# Patient Record
Sex: FEMALE | Race: OTHER | HISPANIC OR LATINO | Employment: FULL TIME | URBAN - METROPOLITAN AREA
[De-identification: names, ages, dates, MRNs, and addresses within clinical notes are randomized per-mention and may not be internally consistent; named-entity substitution may affect disease eponyms.]

---

## 2018-02-08 ENCOUNTER — APPOINTMENT (EMERGENCY)
Dept: CT IMAGING | Facility: HOSPITAL | Age: 33
End: 2018-02-08
Payer: COMMERCIAL

## 2018-02-08 ENCOUNTER — APPOINTMENT (EMERGENCY)
Dept: RADIOLOGY | Facility: HOSPITAL | Age: 33
End: 2018-02-08
Payer: COMMERCIAL

## 2018-02-08 ENCOUNTER — HOSPITAL ENCOUNTER (EMERGENCY)
Facility: HOSPITAL | Age: 33
Discharge: HOME | End: 2018-02-08
Attending: EMERGENCY MEDICINE | Admitting: EMERGENCY MEDICINE
Payer: COMMERCIAL

## 2018-02-08 VITALS
DIASTOLIC BLOOD PRESSURE: 84 MMHG | TEMPERATURE: 97.9 F | HEART RATE: 81 BPM | SYSTOLIC BLOOD PRESSURE: 136 MMHG | RESPIRATION RATE: 20 BRPM | OXYGEN SATURATION: 96 %

## 2018-02-08 DIAGNOSIS — M25.529 ELBOW PAIN: ICD-10-CM

## 2018-02-08 DIAGNOSIS — S09.90XA CLOSED HEAD INJURY, INITIAL ENCOUNTER: ICD-10-CM
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
## 2018-02-08 PROCEDURE — 72125 CT NECK SPINE W/O DYE: CPT

## 2018-02-08 PROCEDURE — 73080 X-RAY EXAM OF ELBOW: CPT

## 2018-02-08 PROCEDURE — 99284 EMERGENCY DEPT VISIT MOD MDM: CPT

## 2018-02-08 PROCEDURE — 70450 CT HEAD/BRAIN W/O DYE: CPT

## 2018-02-08 RX ORDER — NAPROXEN 500 MG/1
500 TABLET ORAL 2 TIMES DAILY WITH MEALS
Qty: 20 TABLET | Refills: 0 | Status: SHIPPED | OUTPATIENT
Start: 2018-02-08 | End: 2018-04-11

## 2018-02-08 NOTE — ED PROVIDER NOTES
History  Chief Complaint   Patient presents with    Elbow Injury     Slipped on ice this morning; reports she injured L elbow area  33 YO female presents after a mechanical fall  States she was walking outside when she slipped on ice, fell backwards  Pt struck the back of her head, denies LOC  Pt currently has a HA, denies nausea or dizziness  States pain in the back of the head, midline C-spine pain and Left elbow pain where she struck her arm on the ground  Pt is not taking anticoagulation  Pt denies CP/SOB/F/C/N/V/D/C, no dysuria, burning on urination or blood in urine  History provided by:  Patient and spouse   used: Yes    Fall   Mechanism of injury: fall    Injury location:  Head/neck and shoulder/arm  Head/neck injury location:  Head  Shoulder/arm injury location:  L elbow  Incident location:  Street  Fall:     Fall occurred:  Walking    Impact surface:  Ice    Point of impact:  Head  Suspicion of alcohol use: no    Suspicion of drug use: no    Prior to arrival data:     Bystander interventions:  None    Patient ambulatory at scene: yes      Blood loss:  None    Responsiveness at scene:  Alert    Orientation at scene:  Person, place, situation and time    Loss of consciousness: no      Amnesic to event: no    Associated symptoms: headaches    Associated symptoms: no abdominal pain, no chest pain and no vomiting        None       History reviewed  No pertinent past medical history  History reviewed  No pertinent surgical history  History reviewed  No pertinent family history  I have reviewed and agree with the history as documented  Social History   Substance Use Topics    Smoking status: Never Smoker    Smokeless tobacco: Never Used    Alcohol use No        Review of Systems   Constitutional: Negative for chills, fatigue and fever  HENT: Negative for dental problem  Eyes: Negative for visual disturbance  Respiratory: Negative for shortness of breath  Cardiovascular: Negative for chest pain  Gastrointestinal: Negative for abdominal pain, diarrhea and vomiting  Genitourinary: Negative for dysuria and frequency  Musculoskeletal: Negative for arthralgias  Skin: Negative for rash  Neurological: Positive for headaches  Negative for dizziness, weakness and light-headedness  Psychiatric/Behavioral: Negative for agitation, behavioral problems and confusion  All other systems reviewed and are negative  Physical Exam  ED Triage Vitals   Temperature Pulse Respirations Blood Pressure SpO2   02/08/18 0748 02/08/18 0748 02/08/18 0746 02/08/18 0748 02/08/18 0748   97 9 °F (36 6 °C) 81 20 136/84 96 %      Temp Source Heart Rate Source Patient Position - Orthostatic VS BP Location FiO2 (%)   02/08/18 0748 -- -- -- --   Oral          Pain Score       02/08/18 0746       8           Orthostatic Vital Signs  Vitals:    02/08/18 0748   BP: 136/84   Pulse: 81       Physical Exam   Constitutional: She is oriented to person, place, and time  She appears well-developed and well-nourished  HENT:   Head: Normocephalic and atraumatic  Eyes: EOM are normal    Neck: Normal range of motion  Cardiovascular: Normal rate, regular rhythm and normal heart sounds  Pulmonary/Chest: Effort normal and breath sounds normal    Abdominal: Soft  There is no tenderness  Musculoskeletal: Normal range of motion  Tender over olecranon with mild swelling, FROM  Midline C-spine tenderness  Neurological: She is alert and oriented to person, place, and time  Skin: Skin is warm and dry  Psychiatric: She has a normal mood and affect  Her behavior is normal  Thought content normal    Nursing note and vitals reviewed  ED Medications  Medications - No data to display    Diagnostic Studies  Results Reviewed     None                 CT cervical spine without contrast   Final Result by Minerva Klein DO (02/08 2053)      No cervical spine fracture or traumatic malalignment  Workstation performed: [id number]         CT head without contrast   Final Result by Tien Key DO (02/08 1093)      No acute intracranial abnormality  5 mm nodule seen along the anterolateral aspect of the left parotid gland, possibly an intraparenchymal lymph node  Serial physical exam recommended to exclude an evolving primary parotid lesion  Workstation performed: [id number]         XR elbow 3+ views LEFT   ED Interpretation by Stevenson Gilford, MD (02/08 0910)   No fracture                 Procedures  Procedures       Phone Contacts  ED Phone Contact    ED Course  ED Course                                MDM  Number of Diagnoses or Management Options  Closed head injury, initial encounter: new and does not require workup  Elbow pain: new and does not require workup  Fall, initial encounter: new and does not require workup  Diagnosis management comments: 1  Fall - Pt with injury to the Left elbow, will x-ray  States she struck the occiput, now with midline c-cpine pain, will obtain CT imaging of head and neck  Amount and/or Complexity of Data Reviewed  Tests in the radiology section of CPT®: ordered and reviewed  Independent visualization of images, tracings, or specimens: yes    Patient Progress  Patient progress: improved    CritCare Time    Disposition  Final diagnoses:   Fall, initial encounter   Elbow pain   Closed head injury, initial encounter     Time reflects when diagnosis was documented in both MDM as applicable and the Disposition within this note     Time User Action Codes Description Comment    2/8/2018  9:34 AM Judyann Flavin E Add [V82  EUEG] Fall, initial encounter     2/8/2018  9:34 AM Judyann Flavin E Add [M25 529] Elbow pain     2/8/2018  9:34 AM Judyann Flavin E Add [S09 90XA] Closed head injury, initial encounter       ED Disposition     ED Disposition Condition Comment    Discharge  Clarene Mya discharge to home/self care    Pt discharged by Dr Citlaly Mata independently of nursing staff  Condition at discharge: Stable        Follow-up Information     Follow up With Specialties Details Why [address], DO Otolaryngology Schedule an appointment as soon as possible for a visit  [address] [place] [address]          Discharge Medication List as of 2/8/2018  9:37 AM      START taking these medications    Details   naproxen (NAPROSYN) 500 mg tablet Take 1 tablet (500 mg total) by mouth 2 (two) times a day with meals for 10 days, Starting u 2/8/2018, Until Sun 2/18/2018, Print           No discharge procedures on file      ED Provider  Electronically Signed by           Main Santillan MD  02/09/18 9025

## 2018-02-08 NOTE — DISCHARGE INSTRUCTIONS
Take the Naprosyn twice daily for discomfort  The CAT scan did show a small nodule on the parotid gland  There is nothing to do about this now but you should have repeat imaging  Speak with your family doctor to discuss imaging in the future and the number for the Ear, Nose and Throat doctor is included in these discharge instructions  Arm Pain   WHAT YOU NEED TO KNOW:   Your arm pain may be caused by a number of conditions  Examples include arthritis, nerve problems, or an awkward position while you sleep  X-rays did not show a broken bone in your arm or wrist  Arm pain may be a sign of a serious condition that needs immediate care, such as a heart attack  DISCHARGE INSTRUCTIONS:   Call 911 for any of the following: You have any of the following signs of a heart attack:   · Squeezing, pressure, or pain in your chest that lasts longer than 5 minutes or returns    · Discomfort or pain in your back, neck, jaw, stomach, or arm     · Trouble breathing or a fast, fluttery heartbeat    · Nausea or vomiting    · Lightheadedness or a sudden cold sweat, especially with chest pain or trouble breathing  Return to the emergency department if:   · You have severe pain, or pain that spreads from your arm to other areas  · You have swelling, tingling, or numbness in your hand or fingers, or the skin turns blue  · You cannot move your arm  Contact your healthcare provider if:   · You have questions or concerns about your condition or care  Medicines: You may need any of the following:  · Prescription pain medicine  may be given  Ask how to take this medicine safely  · NSAIDs , such as ibuprofen, help decrease swelling, pain, and fever  This medicine is available with or without a doctor's order  NSAIDs can cause stomach bleeding or kidney problems in certain people  If you take blood thinner medicine, always ask your healthcare provider if NSAIDs are safe for you   Always read the medicine label and follow directions  · Take your medicine as directed  Contact your healthcare provider if you think your medicine is not helping or if you have side effects  Tell him or her if you are allergic to any medicine  Keep a list of the medicines, vitamins, and herbs you take  Include the amounts, and when and why you take them  Bring the list or the pill bottles to follow-up visits  Carry your medicine list with you in case of an emergency  Self-care:   · Rest your arm as directed  A sling may be used to keep your arm from moving while it heals  · Apply ice as directed  Ice helps decrease pain and swelling  Ice may also help prevent tissue damage  Use an ice pack, or put crushed ice in a plastic bag  Cover it with a towel  Apply it to your arm for 20 minutes every few hours, or as directed  Ask how many times to apply ice each day, and for how many days  · Elevate your arm above the level of your heart as often as you can  This will help decrease swelling and pain  Prop your arm on pillows or blankets to keep the area elevated comfortably  · Adjust your position if you work in front of a computer  You may need arm or wrist supports or change the height of your chair  · Keep a pain record  Write down when your pain happens and how severe it is  Include any other symptoms you have with your pain  A record will help you keep track of pain cycles  Bring the record with you to your follow-up visits  It may also help your healthcare provider find out what is causing your pain  Follow up with your healthcare provider as directed: You may need physical therapy  You may need to see an orthopedic specialist  Write down your questions so you remember to ask them during your visits  © 2017 [address] Information is for End User's use only and may not be sold, redistributed or otherwise used for commercial purposes   All illustrations and images included in CareNotes® are the copyrighted property of A  D A M , Inc  or Osmar Donaldson  The above information is an  only  It is not intended as medical advice for individual conditions or treatments  Talk to your doctor, nurse or pharmacist before following any medical regimen to see if it is safe and effective for you

## 2018-02-08 NOTE — ED NOTES
Reports to have hit head when she fell; reports to have posterior head pain        Elidia Clarke RN  02/08/18 7091

## 2018-04-11 ENCOUNTER — APPOINTMENT (EMERGENCY)
Dept: RADIOLOGY | Facility: HOSPITAL | Age: 33
End: 2018-04-11
Payer: COMMERCIAL

## 2018-04-11 ENCOUNTER — HOSPITAL ENCOUNTER (EMERGENCY)
Facility: HOSPITAL | Age: 33
Discharge: HOME | End: 2018-04-11
Attending: EMERGENCY MEDICINE | Admitting: EMERGENCY MEDICINE
Payer: COMMERCIAL

## 2018-04-11 VITALS
SYSTOLIC BLOOD PRESSURE: 150 MMHG | RESPIRATION RATE: 16 BRPM | DIASTOLIC BLOOD PRESSURE: 86 MMHG | TEMPERATURE: 98.7 F | WEIGHT: 199 LBS | BODY MASS INDEX: 31.98 KG/M2 | HEART RATE: 95 BPM | OXYGEN SATURATION: 99 % | HEIGHT: 66 IN
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
## 2018-04-11 DIAGNOSIS — S13.9XXA ACUTE SPRAIN OF LIGAMENT OF NECK, INITIAL ENCOUNTER: ICD-10-CM

## 2018-04-11 PROCEDURE — 99284 EMERGENCY DEPT VISIT MOD MDM: CPT

## 2018-04-11 PROCEDURE — 72040 X-RAY EXAM NECK SPINE 2-3 VW: CPT

## 2018-04-11 RX ORDER — NAPROXEN 500 MG/1
500 TABLET ORAL 2 TIMES DAILY WITH MEALS
Qty: 14 TABLET | Refills: 0 | Status: SHIPPED | OUTPATIENT
Start: 2018-04-11 | End: 2018-10-19 | Stop reason: ALTCHOICE

## 2018-04-11 RX ORDER — CYCLOBENZAPRINE HCL 10 MG
10 TABLET ORAL 3 TIMES DAILY PRN
Qty: 12 TABLET | Refills: 0 | Status: SHIPPED | OUTPATIENT
Start: 2018-04-11 | End: 2018-10-19 | Stop reason: ALTCHOICE

## 2018-04-11 RX ORDER — ACETAMINOPHEN 325 MG/1
975 TABLET ORAL ONCE
Status: COMPLETED | OUTPATIENT
Start: 2018-04-11 | End: 2018-04-11

## 2018-04-11 RX ORDER — IBUPROFEN 400 MG/1
800 TABLET ORAL ONCE
Status: COMPLETED | OUTPATIENT
Start: 2018-04-11 | End: 2018-04-11

## 2018-04-11 RX ADMIN — ACETAMINOPHEN 975 MG: 325 TABLET, FILM COATED ORAL at 18:43

## 2018-04-11 RX ADMIN — IBUPROFEN 800 MG: 400 TABLET ORAL at 18:43

## 2018-04-11 NOTE — DISCHARGE INSTRUCTIONS
Accidente automovilístico   LO QUE NECESITA SABER:   Los accidentes automovilísticos pueden causar lesiones ocasionadas por el impacto o por rakesh sido movido de un lado al otro dentro del andreas  Podría tener un hematoma en el abdomen, pecho o vega debido al cinturón de seguridad  También puede que tenga dolor en kauffman luis e, vega o espalda  Podría sentir dolor en las rodillas, caderas o muslos si kauffman cuerpo golpea el tablero o el volante  El dolor muscular tiende a empeorar de 1 a 2 días después del accidente  INSTRUCCIONES SOBRE [facility]ITAL[facility]:   [place] al 911 si presenta:   · Usted tiene un nuevo dolor de pecho o éste [facility], o tiene falta de [place]  Regrese a la bridgett de emergencias si:   · Usted tiene un dolor nuevo o peor en el abdomen  · Usted tiene náuseas y vómitos que no mejoran  · Usted tiene un dimitri dolor de enrique  · Usted tiene debilidad, hormigueo o adormecimiento en sarbjit brazos o piernas  · Usted tiene un dolor nuevo o peor que le dificulta el movimiento  Pregúntele a kauffman [employer] vitaminas y minerales son adecuados para usted  · Usted tiene dolor que aparece de 2 a 3 días después del accidente  · Usted tiene preguntas o inquietudes acerca de kauffman condición o cuidado  Medicamentos:   · Analgésicos:  Usted podría recibir medicamento para quitarle o reducir el dolor  No espere a que el dolor sea muy intenso para lesley el medicamento  · AINEs (Analgésicos antiinflamatorios no esteroides) jsohua el ibuprofeno, ayudan a disminuir la inflamación, el dolor y la [place]  Skylar medicamento esta disponible con o sin tutu receta médica  Los AINEs pueden causar sangrado estomacal o problemas renales en ciertas personas  Si usted esta tomando un anticoágulante,  siempre  pregunte si los AINEs son seguros para usted  Siempre saritha la etiqueta de skylar medicamento y [place] instrucciones   No administre skylar medicamento a niños menores de 6 meses de jeet sin antes obtener la autorización de kauffman médico      · [place] sarbjit medicamentos joshua se le haya indicado  Consulte con kauffman médico si usted martinez que kauffman medicamento no le está ayudando o si presenta efectos secundarios  Infórmele si es alérgico a algún medicamento  Mantenga tutu lista actualizada de los [place], las vitaminas y los productos herbales que glendy  Incluya los siguientes datos de los medicamentos: cantidad, frecuencia y motivo de administración  Traiga con usted la lista o los envases de la píldoras a sarbjit citas de seguimiento  Lleve la lista de los medicamentos con usted en myles de tutu emergencia  Acuda a sarbjit consultas de control con kauffman médico según le indicaron  Anote sarbjit preguntas para que se acuerde de hacerlas jose ramon sarbjit visitas  Consejos de seguridad:   · Use siempre kauffman cinturón de seguridad  [place] de kauffman cinturón de seguridad ayudará a reducir las lesiones sufridas por accidentes automovilísticos  · Use asientos de seguridad para niños  Es necesario que kauffman hema se siente en un asiento de seguridad para niños hecho para kauffman edad, altura, y [place]  Pregúntele a kauffman médico sobre [address] acerca de los asientos de seguridad para niños  · Brianafurt velocidad  Maneje kauffman andreas al límite de velocidad para reducir kauffman riesgo de accidentes automovilísticos  · No maneje si se siente cansado  Usted reacciona más lentamente cuando está cansado  El tiempo de reacción lento aumentará el riesgo de un accidente automovilístico      · No hable por teléfono ni envíe mensajes de texto [employer]  Usted no reaccionará lo suficientemente rápido en tutu emergencia si se distrae con mensajes de texto o conversaciones  · No kylie y [facility]  Use un chofer designado  Llame un taxi o pídale a alguien que lo lleve a casa si ha estado bebiendo alcohol  No permita que sarbjit amigos manejen si cosby estado bebiendo alcohol  · No consuma drogas ilegales y [facility]    Es probable que se sienta más cansado o tome riesgos que usualmente no tomaría  No maneje después de lesley medicamentos prescritos que le dan sueño  Cuidados personales:   · Use hielo y calor  El hielo ayuda a disminuir la inflamación y el dolor  El hielo también puede contribuir a evitar el daño de los tejidos  Use un paquete de hielo o ponga hielo molido dentro de [employer]  [place]ala con tutu toalla y aplíquela al área adolorida por 15 a 20 minutos cada hora o joshua se le indique  Después de 2 días, use tutu compresa caliente [employer]  Aplique calor joshua se lo recomiende el médico      · Estire sarbjit músculos cuidadosamente  Jason ejercicios suaves para estirar sarbjit músculos después de rakesh sufrido un accidente automovilístico  Consulte con batista médico sobre cuáles ejercicios hacer  © 2017 2600 [facility] Information is for End User's use only and may not be sold, redistributed or otherwise used for commercial purposes  All illustrations and images included in CareNotes® are the copyrighted property of A D A M  Inc  or Osmar Donaldson  Esta información es sólo para uso en educación  Batista intención no es darle un consejo médico sobre enfermedades o tratamientos  Colsulte con batista Ginger Cables farmacéutico antes de seguir cualquier régimen médico para saber si es seguro y efectivo para usted  Esguince cervical   LO QUE NECESITA SABER:   Un esguince cervical es un estiramiento o desgarre de un músculo o ligamento en el vega  Los ligamentos son los tejidos geno que unen a los [place]  Las causas comunes de los esguinces cervicales incluyen un accidente automovilístico, tutu caída o tutu lesión deportiva  INSTRUCCIONES SOBRE [facility]PITALARIA:   Regrese a la bridgett de emergencias si:   · Usted tiene dolor o entumecimiento de sarbjit hombros a batista mano  · Usted tiene problemas de visión, audición o equilibrio  · Usted se siente confundido o no puede concentrarse  · Tiene problemas con el movimiento y fuerza    Pregúntele a batista Marvie Backers vitaminas y minerales son adecuados para usted  · Usted tiene más inflamación o dolor en kauffman vega  · Usted tiene preguntas o inquietudes acerca de kauffman condición o cuidado  Medicamentos:  Es posible que usted necesite alguno de los siguientes:  · Acetaminofeno:  tobias el dolor y baja la fiebre  Está disponible sin receta médica  Pregunte la cantidad y la frecuencia con que debe tomarlos  [address]  Sulema las etiquetas de todos los demás medicamentos que esté usando para saber si también contienen acetaminofén, o pregunte a kauffman médico o farmacéutico  El acetaminofén puede causar daño en el hígado cuando no se glendy de forma correcta  No use más de 4 gramos (4000 miligramos) en total de acetaminofeno en un día  · AINEs (Analgésicos antiinflamatorios no esteroides) joshua el ibuprofeno, ayudan a disminuir la inflamación, el dolor y la [place]  [place]ylar medicamento esta disponible con o sin tutu receta médica  Los AINEs pueden causar sangrado estomacal o problemas renales en ciertas personas  Si usted glendy un medicamento anticoagulante, siempre pregúntele a kauffman médico si los KB son seguros para usted  Siempre sulema la etiqueta de skylar medicamento y [place] instrucciones  · Relajantes musculares  ayudan a reducir dolor y espasmos musculares  · Un medicamento con receta para el dolor  podrían ser Damon Hendrum  Pregunte al médico cómo debe lesley skylar medicamento de forma mckeon  Algunos medicamentos recetados para el dolor contienen acetaminofén  No tome otros medicamentos que contengan acetaminofén sin consultarlo con kauffman médico  Demasiado acetaminofeno puede causar daño al hígado  Los medicamentos recetados para el dolor podrían causar estreñimiento  Pregunte a kauffman médico joshua prevenir o tratar estreñimiento  · [place] sarbjit medicamentos joshua se le haya indicado  Consulte con kauffman médico si usted martinez que kauffman medicamento no le está ayudando o si presenta efectos secundarios   Infórmele si es alérgico a cualquier medicamento  Mantenga tutu lista actualizada de los [place], las vitaminas y los productos herbales que glendy  Incluya los siguientes datos de los medicamentos: cantidad, frecuencia y motivo de administración  Traiga con usted la lista o los envases de la píldoras a sarbjit citas de seguimiento  Lleve la lista de los medicamentos con usted en myles de tutu emergencia  El manejo de kauffman síntomas:   · La aplicación de calor  en el vega jose ramon 15 a 20 minutos, 4 a 6 veces por día o según lo indicado  El calor ayuda a disminuir el dolor, la rigidez y los espasmos musculares  · Comience con ejercicios suaves para kauffman vega  tan pronto joshua usted pueda  kauffman vega sin sentir dolor  Los ejercicios le ayudarán a disminuir la rigidez y a mejorar la fuerza y el rango de movimiento de kauffman vega  Pregunte a kauffman médico qué tipo de ejercicios debe hacer  · Regrese a sarbjit actividades usuales joshua se le indique  Suspéndalas si siente dolor  Evite las [employer] pueden provocar mas daño al vega, joshua levantar objetos pesados o realizar ejercicio extenuante o de zaid intensidad  · [place] sin almohada  para ayudar a reducir el dolor  En kauffman lugar, enrolle tutu toalla pequeña lorraine apretada y colóquela bajo kauffman vega  · Vaya a terapia física según indicaciones  Un fisioterapeuta le puede enseñar ejercicios para ayudarle a mejorar el movimiento y la fuerza, y para disminuir el dolor  Evite tutu lesión en el vega:   · Conduzca con seguridad  Asegúrese que todos en el andreas usan el cinturón de seguridad  Un cinturón de seguridad puede salvar kauffman jeet en myles de un accidente automovilístico  No use el celular cuando esté manejando  [place] puede hacer que se distraiga y causar un accidente  Es mejor que pare y se orille si necesita hacer tutu [place] un texto  · Use un susan, un chaleco salvavidas y unos implementos de protección    Siempre use un susan al [employer] en bicicleta o motocicleta, esquiar o jugar deportes que podrían causar tutu lesión en la enirque  Use implementos de protección cuando practique deportes  Use un chaleco salvavidas cuando esté en un bote o practicando actividades acuáticas  Acuda a sarbjit consultas de control con batista médico según le indicaron  Puede ser Erica Salter a un ortopedista o fisioterapeuta  Anote sarbjit preguntas para que se acuerde de hacerlas jose ramon sarbjit visitas  © 2017 [address] Information is for End User's use only and may not be sold, redistributed or otherwise used for commercial purposes  All illustrations and images included in CareNotes® are the copyrighted property of A D A M , Inc  or Osmar Donaldson  Esta información es sólo para uso en educación  Batista intención no es darle un consejo médico sobre enfermedades o tratamientos  Colsulte con batista Bentley Kristen farmacéutico antes de seguir cualquier régimen médico para saber si es seguro y efectivo para usted
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
## 2018-10-18 ENCOUNTER — HOSPITAL ENCOUNTER (EMERGENCY)
Facility: HOSPITAL | Age: 33
Discharge: HOME | End: 2018-10-19
Attending: EMERGENCY MEDICINE | Admitting: EMERGENCY MEDICINE
Payer: COMMERCIAL

## 2018-10-18 DIAGNOSIS — N83.209 RUPTURED OVARIAN CYST: Primary | ICD-10-CM

## 2018-10-18 DIAGNOSIS — R10.31 RLQ ABDOMINAL PAIN: ICD-10-CM

## 2018-10-18 PROCEDURE — 99284 EMERGENCY DEPT VISIT MOD MDM: CPT

## 2018-10-19 ENCOUNTER — APPOINTMENT (EMERGENCY)
Dept: CT IMAGING | Facility: HOSPITAL | Age: 33
End: 2018-10-19
Payer: COMMERCIAL

## 2018-10-19 ENCOUNTER — APPOINTMENT (EMERGENCY)
Dept: ULTRASOUND IMAGING | Facility: HOSPITAL | Age: 33
End: 2018-10-19
Payer: COMMERCIAL

## 2018-10-19 VITALS
WEIGHT: 183 LBS | DIASTOLIC BLOOD PRESSURE: 66 MMHG | TEMPERATURE: 97.5 F | OXYGEN SATURATION: 98 % | BODY MASS INDEX: 29.54 KG/M2 | SYSTOLIC BLOOD PRESSURE: 119 MMHG | RESPIRATION RATE: 16 BRPM | HEART RATE: 86 BPM

## 2018-10-19 LAB
ALBUMIN SERPL BCP-MCNC: 3.8 G/DL (ref 3.5–5)
ALP SERPL-CCNC: 67 U/L (ref 46–116)
ALT SERPL W P-5'-P-CCNC: 22 U/L (ref 12–78)
ANION GAP BLD CALC-SCNC: 17 MMOL/L (ref 4–13)
ANION GAP SERPL CALCULATED.3IONS-SCNC: 5 MMOL/L (ref 4–13)
AST SERPL W P-5'-P-CCNC: 13 U/L (ref 5–45)
BACTERIA UR QL AUTO: ABNORMAL /HPF
BASOPHILS # BLD AUTO: 0.02 THOUSANDS/ΜL (ref 0–0.1)
BASOPHILS NFR BLD AUTO: 0 % (ref 0–1)
BILIRUB SERPL-MCNC: 0.34 MG/DL (ref 0.2–1)
BILIRUB UR QL STRIP: NEGATIVE
BUN BLD-MCNC: 15 MG/DL (ref 5–25)
BUN SERPL-MCNC: 14 MG/DL (ref 5–25)
CA-I BLD-SCNC: 1.17 MMOL/L (ref 1.12–1.32)
CALCIUM SERPL-MCNC: 9 MG/DL (ref 8.3–10.1)
CHLORIDE BLD-SCNC: 100 MMOL/L (ref 100–108)
CHLORIDE SERPL-SCNC: 101 MMOL/L (ref 100–108)
CLARITY UR: CLEAR
CO2 SERPL-SCNC: 29 MMOL/L (ref 21–32)
COLOR UR: YELLOW
CREAT BLD-MCNC: 0.7 MG/DL (ref 0.6–1.3)
CREAT SERPL-MCNC: 0.76 MG/DL (ref 0.6–1.3)
EOSINOPHIL # BLD AUTO: 0.11 THOUSAND/ΜL (ref 0–0.61)
EOSINOPHIL NFR BLD AUTO: 1 % (ref 0–6)
ERYTHROCYTE [DISTWIDTH] IN BLOOD BY AUTOMATED COUNT: 12.9 % (ref 11.6–15.1)
EXT PREG TEST URINE: NORMAL
GFR SERPL CREATININE-BSD FRML MDRD: 103 ML/MIN/1.73SQ M
GFR SERPL CREATININE-BSD FRML MDRD: 113 ML/MIN/1.73SQ M
GLUCOSE SERPL-MCNC: 134 MG/DL (ref 65–140)
GLUCOSE SERPL-MCNC: 139 MG/DL (ref 65–140)
GLUCOSE UR STRIP-MCNC: NEGATIVE MG/DL
HCT VFR BLD AUTO: 40.5 % (ref 34.8–46.1)
HCT VFR BLD CALC: 41 % (ref 34.8–46.1)
HGB BLD-MCNC: 13.4 G/DL (ref 11.5–15.4)
HGB BLDA-MCNC: 13.9 G/DL (ref 11.5–15.4)
HGB UR QL STRIP.AUTO: ABNORMAL
IMM GRANULOCYTES # BLD AUTO: 0.04 THOUSAND/UL (ref 0–0.2)
IMM GRANULOCYTES NFR BLD AUTO: 0 % (ref 0–2)
KETONES UR STRIP-MCNC: NEGATIVE MG/DL
LEUKOCYTE ESTERASE UR QL STRIP: ABNORMAL
LYMPHOCYTES # BLD AUTO: 2.45 THOUSANDS/ΜL (ref 0.6–4.47)
LYMPHOCYTES NFR BLD AUTO: 23 % (ref 14–44)
MCH RBC QN AUTO: 27.6 PG (ref 26.8–34.3)
MCHC RBC AUTO-ENTMCNC: 33.1 G/DL (ref 31.4–37.4)
MCV RBC AUTO: 84 FL (ref 82–98)
MONOCYTES # BLD AUTO: 0.53 THOUSAND/ΜL (ref 0.17–1.22)
MONOCYTES NFR BLD AUTO: 5 % (ref 4–12)
NEUTROPHILS # BLD AUTO: 7.45 THOUSANDS/ΜL (ref 1.85–7.62)
NEUTS SEG NFR BLD AUTO: 71 % (ref 43–75)
NITRITE UR QL STRIP: NEGATIVE
NON-SQ EPI CELLS URNS QL MICRO: ABNORMAL /HPF
NRBC BLD AUTO-RTO: 0 /100 WBCS
PCO2 BLD: 27 MMOL/L (ref 21–32)
PH UR STRIP.AUTO: 5.5 [PH] (ref 4.5–8)
PLATELET # BLD AUTO: 204 THOUSANDS/UL (ref 149–390)
PMV BLD AUTO: 10.3 FL (ref 8.9–12.7)
POTASSIUM BLD-SCNC: 3.8 MMOL/L (ref 3.5–5.3)
POTASSIUM SERPL-SCNC: 3.7 MMOL/L (ref 3.5–5.3)
PROT SERPL-MCNC: 8.1 G/DL (ref 6.4–8.2)
PROT UR STRIP-MCNC: NEGATIVE MG/DL
RBC # BLD AUTO: 4.85 MILLION/UL (ref 3.81–5.12)
RBC #/AREA URNS AUTO: ABNORMAL /HPF
SODIUM BLD-SCNC: 139 MMOL/L (ref 136–145)
SODIUM SERPL-SCNC: 135 MMOL/L (ref 136–145)
SP GR UR STRIP.AUTO: >=1.03 (ref 1–1.03)
SPECIMEN SOURCE: ABNORMAL
UROBILINOGEN UR QL STRIP.AUTO: 0.2 E.U./DL
WBC # BLD AUTO: 10.6 THOUSAND/UL (ref 4.31–10.16)
WBC #/AREA URNS AUTO: ABNORMAL /HPF

## 2018-10-19 PROCEDURE — 96361 HYDRATE IV INFUSION ADD-ON: CPT

## 2018-10-19 PROCEDURE — 81025 URINE PREGNANCY TEST: CPT | Performed by: NURSE PRACTITIONER

## 2018-10-19 PROCEDURE — 76830 TRANSVAGINAL US NON-OB: CPT

## 2018-10-19 PROCEDURE — 96375 TX/PRO/DX INJ NEW DRUG ADDON: CPT

## 2018-10-19 PROCEDURE — 36415 COLL VENOUS BLD VENIPUNCTURE: CPT | Performed by: NURSE PRACTITIONER

## 2018-10-19 PROCEDURE — 81001 URINALYSIS AUTO W/SCOPE: CPT

## 2018-10-19 PROCEDURE — 74177 CT ABD & PELVIS W/CONTRAST: CPT

## 2018-10-19 PROCEDURE — 76856 US EXAM PELVIC COMPLETE: CPT

## 2018-10-19 PROCEDURE — 80053 COMPREHEN METABOLIC PANEL: CPT | Performed by: NURSE PRACTITIONER

## 2018-10-19 PROCEDURE — 80047 BASIC METABLC PNL IONIZED CA: CPT

## 2018-10-19 PROCEDURE — 85014 HEMATOCRIT: CPT

## 2018-10-19 PROCEDURE — 96374 THER/PROPH/DIAG INJ IV PUSH: CPT

## 2018-10-19 PROCEDURE — 85025 COMPLETE CBC W/AUTO DIFF WBC: CPT | Performed by: NURSE PRACTITIONER

## 2018-10-19 RX ORDER — ONDANSETRON 2 MG/ML
4 INJECTION INTRAMUSCULAR; INTRAVENOUS ONCE
Status: COMPLETED | OUTPATIENT
Start: 2018-10-19 | End: 2018-10-19

## 2018-10-19 RX ORDER — IBUPROFEN 400 MG/1
600 TABLET ORAL EVERY 8 HOURS PRN
Qty: 30 TABLET | Refills: 0 | Status: SHIPPED | OUTPATIENT
Start: 2018-10-19 | End: 2019-05-20

## 2018-10-19 RX ORDER — KETOROLAC TROMETHAMINE 30 MG/ML
30 INJECTION, SOLUTION INTRAMUSCULAR; INTRAVENOUS ONCE
Status: COMPLETED | OUTPATIENT
Start: 2018-10-19 | End: 2018-10-19

## 2018-10-19 RX ADMIN — IOHEXOL 100 ML: 350 INJECTION, SOLUTION INTRAVENOUS at 00:38

## 2018-10-19 RX ADMIN — SODIUM CHLORIDE 1000 ML: 0.9 INJECTION, SOLUTION INTRAVENOUS at 00:42

## 2018-10-19 RX ADMIN — ONDANSETRON 4 MG: 2 INJECTION INTRAMUSCULAR; INTRAVENOUS at 00:20

## 2018-10-19 RX ADMIN — KETOROLAC TROMETHAMINE 30 MG: 30 INJECTION, SOLUTION INTRAMUSCULAR at 00:21

## 2018-10-19 NOTE — DISCHARGE INSTRUCTIONS
Your CT and Ultrasound show that you have a ruptured ovarian cyst on the right side with a small amount of pelvic blood  You are to take motrin for pain  Eat a light diet  No sex until you follow up with your GYN  Rest   Rotura de quiste ovárico   LO QUE NECESITA SABER:   La rotura de quiste ovárico se produce cuando el quiste se rompe  Un quiste es un saco que crece en danie de los ovarios  Skylar saco usualmente contiene líquido [place], en ocasiones, puede contener elana o tejido dentro de [place]  Un quiste xuan que se rompe puede causar problemas que necesitan atención inmediata  Es importante que programe tutu ana con batista médico para asegurarse de que el quiste desapareció por completo con el tratamiento  INSTRUCCIONES SOBRE EL [facility]:   Llame al 911 en myles de presentar lo siguiente:   · Usted se siente demasiado débil o mareado para ponerse de pie  Regrese a la bridgett de emergencias si:   · Tiene dolor intenso en el abdomen o pelvis  · Tiene dolor y [place] o vómitos  · Tiene signos de shock por pérdida de elana, tales joshua mareos, piel fría o pegajosa, o respiración rápida  Pregúntele a batista Janice Arceo vitaminas y minerales son adecuados para usted  · Nota cambios en sarbjit periodos menstruales, o comienza a tener náuseas o vómitos con sarbjit periodos  · Usted tiene nuevos síntomas o sarbjit síntomas empeoran  · Batista dolor no mejora aún con medicamento para el dolor  · Usted tiene [address]  · Usted tiene sangrado vaginal que no es de batista periodo menstrual     · Batista abdomen se hincha, siente el abdomen lleno o tiene sensación de pesadez en el abdomen  · Tiene dificultad para orinar  · Usted tiene preguntas o inquietudes acerca de batista condición o cuidado    Medicamentos:  Es posible que usted necesite alguno de los siguientes:  · AINEs (Analgésicos antiinflamatorios no esteroides) joshua el ibuprofeno, ayudan a disminuir la inflamación, el dolor y la fiebre  Skylar medicamento esta disponible con o sin tutu receta médica  Los AINEs pueden causar sangrado estomacal o problemas renales en ciertas personas  Si usted esta tomando un anticoágulante,  siempre  pregunte si los AINEs son seguros para usted  Siempre saritha la etiqueta de skylar medicamento y [place] instrucciones  No administre skylar medicamento a niños menores de 6 meses de jeet sin antes obtener la autorización de kauffman médico      · Antibióticos  podrían administrarse para combatir o prevenir tutu infección causada por bacterias  · [place] sarbjit medicamentos joshua se le haya indicado  Consulte con kauffman médico si usted martinez que kauffman medicamento no le está ayudando o si presenta efectos secundarios  Infórmele si es alérgico a cualquier medicamento  Mantenga tutu lista actualizada de los [employer], las vitaminas y los productos herbales que glendy  Incluya los siguientes datos de los medicamentos: cantidad, frecuencia y motivo de administración  Traiga con usted la lista o los envases de la píldoras a sarbjit citas de seguimiento  Lleve la lista de los medicamentos con usted en myles de tutu emergencia  Control o prevención de la rotura de quiste ovárico:   · Aplique calor donde tenga dolor, joshua se lo indicaron  El calor puede ayudar a aliviar el dolor leve  Use tutu almohadilla térmica (con temperatura baja) o tutu botella de [place]  Envuelva la almohadilla o la botella con tutu toalla antes de aplicarla en la piel  Aplique calor jose ramon 20 minutos cada hora o joshua se lo indicaron  Un baño tibio también puede ayudar a aliviar el dolor  · Pregunte cuándo debe hacerse un examen de seguimiento  Puede que necesite otra ecografía 6 semanas después de que el quiste se trató  [place] ayudará a asegurarse de que el quiste ya no esté creciendo ni causando problemas de [place]  También puede necesitar ecografías jose ramon 2 o 3 períodos menstruales para ceasar cómo las hormonas afectan a los ovarios      · [place] píldoras anticonceptivas  Estas pueden ayudar a reducir [employer] de quistes  Consulte a batista médico si las píldoras anticonceptivas son [place] para usted  El riesgo de un coágulo de elana es mayor si glendy píldoras anticonceptivas, especialmente si usted es mayor de 35 años o fuma  · Hágase un examen pélvico cada año  [place] también se conoce joshua visita ginecológica  El examen incluirá tutu prueba de [place] para detectar ciertos tipos de cáncer  Batista médico también le presionará el abdomen para detectar tumores u otros problemas  Un examen pélvico puede ayudar a detectar problemas desde el inicio  [place] hace que el tratamiento sea más fácil y Devyn Love a batista médico si nota cualquier cambio en los períodos menstruales  Los ejemplos incluyen períodos que comienzan en un día diferente al habitual, o que son Rolin Penning que de costumbre  Dígale a batista médico si tiene un dolor peor al habitual o si el dolor es diferente al que tenía antes  Acuda a sarbjit consultas de control con batista médico según le indicaron  Anote sarbjit preguntas para que se acuerde de hacerlas jose ramon sarbjit visitas  © 2017 [address]  Information is for End User's use only and may not be sold, redistributed or otherwise used for commercial purposes  All illustrations and images included in CareNotes® are the copyrighted property of A D A M , Inc  or Osmar Donaldson  Esta información es sólo para uso en educación  Batista intención no es darle un consejo médico sobre enfermedades o tratamientos  Colsulte con batista Ronit Gomes farmacéutico antes de seguir cualquier régimen médico para saber si es seguro y efectivo para usted

## 2018-10-19 NOTE — ED NOTES
Pt unable to provide urine sample at this time  Pt aware a sample is needed       Aneesh Whyte  10/19/18 0004

## 2018-10-19 NOTE — ED NOTES
Patient transported to [facility], RN  10/19/18 8490

## 2018-10-19 NOTE — ED PROVIDER NOTES
History  Chief Complaint   Patient presents with    Abdominal Pain     pt with right sided abdominal pain with nausea  pt states constipated with last BM on      This is a 28year old female who states at [facility] while attempting to have a BM developed severe RLQ pain with nausea  Denies vomiting  States has not had a BM since   States LMP 10/3/18  Last intercourse last week per   Pt states still has appendix  History provided by:  Patient, spouse and medical records   used: No    Abdominal Pain   Pain location:  RLQ  Pain quality: shooting and stabbing    Onset quality:  Sudden  Timing:  Constant  Progression:  Worsening  Chronicity:  New  Relieved by:  None tried  Associated symptoms: constipation        None       Past Medical History:   Diagnosis Date    Anemia, iron deficiency        Past Surgical History:   Procedure Laterality Date    ABDOMINAL SURGERY      abdominoplasty     SECTION      REDUCTION MAMMAPLASTY  2014    Dr Sumeet Pinzon       Family History   Problem Relation Age of Onset    Diabetes Mother     Diabetes Father     Hypertension Maternal Grandmother     Diabetes Maternal Grandmother      I have reviewed and agree with the history as documented  Social History   Substance Use Topics    Smoking status: Never Smoker    Smokeless tobacco: Never Used      Comment: no/never passive smoke exposure    Alcohol use Yes        Review of Systems   Constitutional: Negative  HENT: Negative  Eyes: Negative  Respiratory: Negative  Cardiovascular: Negative  Gastrointestinal: Positive for abdominal pain and constipation  Endocrine: Negative  Genitourinary: Negative  Musculoskeletal: Negative  Skin: Negative  Allergic/Immunologic: Negative  Neurological: Negative  Hematological: Negative  Psychiatric/Behavioral: Negative          Physical Exam  Physical Exam   Constitutional: She is oriented to person, place, and time  She appears well-developed and well-nourished  She appears distressed  Pt is writhing on the exam bed    HENT:   Head: Normocephalic and atraumatic  Eyes: Pupils are equal, round, and reactive to light  EOM are normal    Neck: Normal range of motion  Neck supple  Cardiovascular: Normal rate, regular rhythm and normal heart sounds  Pulmonary/Chest: Effort normal and breath sounds normal    Abdominal: Soft  Bowel sounds are normal  She exhibits distension  There is tenderness  There is guarding  RLQ TTP  Difficult to assess due to pt writhing on bed with pain    Musculoskeletal: Normal range of motion  Neurological: She is alert and oriented to person, place, and time  Skin: Skin is warm and dry  Capillary refill takes less than 2 seconds  She is not diaphoretic  Psychiatric: She has a normal mood and affect  Her behavior is normal  Judgment and thought content normal    Nursing note and vitals reviewed        Vital Signs  ED Triage Vitals [10/18/18 2351]   Temperature Pulse Respirations Blood Pressure SpO2   97 5 °F (36 4 °C) 74 20 112/66 100 %      Temp Source Heart Rate Source Patient Position - Orthostatic VS BP Location FiO2 (%)   Oral Monitor Sitting Right arm --      Pain Score       9           Vitals:    10/18/18 2351 10/19/18 0215   BP: 112/66 119/66   Pulse: 74 86   Patient Position - Orthostatic VS: Sitting Lying       Visual Acuity      ED Medications  Medications   ondansetron (ZOFRAN) injection 4 mg (4 mg Intravenous Given 10/19/18 0020)   ketorolac (TORADOL) injection 30 mg (30 mg Intravenous Given 10/19/18 0021)   sodium chloride 0 9 % bolus 1,000 mL (0 mL Intravenous Hold 10/19/18 0046)   iohexol (OMNIPAQUE) 350 MG/ML injection (SINGLE-DOSE) 100 mL (100 mL Intravenous Given 10/19/18 0038)       Diagnostic Studies  Results Reviewed     Procedure Component Value Units Date/Time    Urine Microscopic [[id number]]  (Abnormal) Collected:  10/19/18 0031    Lab Status: Final result Specimen:  Urine from Urine, Clean Catch Updated:  10/19/18 0041     RBC, UA 1-2 (A) /hpf      WBC, UA 4-10 (A) /hpf      Epithelial Cells Occasional /hpf      Bacteria, UA Occasional /hpf     Comprehensive metabolic panel [[id number]]  (Abnormal) Collected:  10/19/18 0017    Lab Status:  Final result Specimen:  Blood from Arm, Right Updated:  10/19/18 0037     Sodium 135 (L) mmol/L      Potassium 3 7 mmol/L      Chloride 101 mmol/L      CO2 29 mmol/L      ANION GAP 5 mmol/L      BUN 14 mg/dL      Creatinine 0 76 mg/dL      Glucose 139 mg/dL      Calcium 9 0 mg/dL      AST 13 U/L      ALT 22 U/L      Alkaline Phosphatase 67 U/L      Total Protein 8 1 g/dL      Albumin 3 8 g/dL      Total Bilirubin 0 34 mg/dL      eGFR 103 ml/min/1 73sq m     Narrative:         National Kidney Disease Education Program recommendations are as follows:  GFR calculation is accurate only with a steady state creatinine  Chronic Kidney disease less than 60 ml/min/1 73 sq  meters  Kidney failure less than 15 ml/min/1 73 sq  meters      POCT Chem 8+ [[id number]]  (Abnormal) Collected:  10/19/18 0020    Lab Status:  Final result Updated:  10/19/18 0024     SODIUM, I-STAT 139 mmol/l      Potassium, i-STAT 3 8 mmol/L      Chloride, istat 100 mmol/L      CO2, i-STAT 27 mmol/L      Anion Gap, Istat 17 (H) mmol/L      Calcium, Ionized i-STAT 1 17 mmol/L      BUN, I-STAT 15 mg/dl      Creatinine, i-STAT 0 7 mg/dl      eGFR 113 ml/min/1 73sq m      Glucose, i-STAT 134 mg/dl      Hct, i-STAT 41 %      Hgb, i-STAT 13 9 g/dl      Specimen Type VENOUS    POCT urinalysis dipstick [[id number]]  (Abnormal) Resulted:  10/19/18 0023    Lab Status:  Final result Specimen:  Urine Updated:  10/19/18 0023    CBC and differential [[id number]]  (Abnormal) Collected:  10/19/18 0017    Lab Status:  Final result Specimen:  Blood from Arm, Right Updated:  10/19/18 0023     WBC 10 60 (H) Thousand/uL      RBC 4 85 Million/uL      Hemoglobin 13 4 g/dL      Hematocrit 40 5 %      MCV 84 fL      MCH 27 6 pg      MCHC 33 1 g/dL      RDW 12 9 %      MPV 10 3 fL      Platelets 973 Thousands/uL      nRBC 0 /100 WBCs      Neutrophils Relative 71 %      Immat GRANS % 0 %      Lymphocytes Relative 23 %      Monocytes Relative 5 %      Eosinophils Relative 1 %      Basophils Relative 0 %      Neutrophils Absolute 7 45 Thousands/µL      Immature Grans Absolute 0 04 Thousand/uL      Lymphocytes Absolute 2 45 Thousands/µL      Monocytes Absolute 0 53 Thousand/µL      Eosinophils Absolute 0 11 Thousand/µL      Basophils Absolute 0 02 Thousands/µL     POCT pregnancy, urine [[id number]]  (Normal) Resulted:  10/19/18 0020    Lab Status:  Final result Updated:  10/19/18 0020     EXT PREG TEST UR (Ref: Negative) NEGATIVE (-)    ED Urine Macroscopic [[id number]]  (Abnormal) Collected:  10/19/18 0031    Lab Status:  Final result Specimen:  Urine Updated:  10/19/18 0019     Color, UA Yellow     Clarity, UA Clear     pH, UA 5 5     Leukocytes, UA Trace (A)     Nitrite, UA Negative     Protein, UA Negative mg/dl      Glucose, UA Negative mg/dl      Ketones, UA Negative mg/dl      Urobilinogen, UA 0 2 E U /dl      Bilirubin, UA Negative     Blood, UA Moderate (A)     Specific Gravity, UA >=1 030    Narrative:       CLINITEK RESULT                 US pelvis complete w transvaginal   Final Result by Jordana Smith MD (10/19 2548)       1  No evidence of ovarian torsion  2   Irregular thick-walled lesion in the right ovary compatible with a ruptured hemorrhagic cyst    3   Complex free fluid in the pelvis likely due to blood products  Workstation performed: [id number]         CT abdomen pelvis with contrast   Final Result by Hossein Sorto MD (10/19 9546)         1  Small amount of fluid in the lower abdomen and pelvis likely to represent hemoperitoneum    Probable rupture of a right adnexal hemorrhagic cyst   If there is persistent concern for ovarian torsion, ultrasound recommended  2   Normal appendix  No evidence of bowel obstruction or colitis  I personally discussed this study with Deng Mccormick on 10/19/2018 at 12:52 AM                    Workstation performed: [id number]                    Procedures  Procedures       Phone Contacts  ED Phone Contact    ED Course  ED Course as of Oct 19 0337   Fri Oct 19, 2018   0047 Labs reviewed and discussed with pt and   IV infiltrated and pt has not received  all IVF ordered  Waiting on CT results  4076 Reviewed CT results with pt       0058 IMPRESSION:        1  Small amount of fluid in the lower abdomen and pelvis likely to represent hemoperitoneum  Probable rupture of a right adnexal hemorrhagic cyst   If there is persistent concern for ovarian torsion, ultrasound recommended  2   Normal appendix  No evidence of bowel obstruction or colitis  [address] aware of need of US tech called in  Pt agrees with POC      0210 IMPRESSION:     1  No evidence of ovarian torsion  2   Irregular thick-walled lesion in the right ovary compatible with a ruptured hemorrhagic cyst   3   Complex free fluid in the pelvis likely due to blood products  [address] results discussed with pt  Pt verbalizes understanding of all d/c instructions  She states she has a GYN and will follow up                                   MDM  Number of Diagnoses or Management Options  Diagnosis management comments: Differential diagnosis  Appendicitis  Ovarian torsion  Ovarian cyst rupture  Ectopic pregnancy  Constipation  Bowel obstruction    Plan  Labs  IVF  Pain control  ua  uahcg  CT A/P          Amount and/or Complexity of Data Reviewed  Clinical lab tests: ordered and reviewed  Tests in the radiology section of CPT®: ordered and reviewed  Review and summarize past medical records: yes      CritCare Time    Disposition  Final diagnoses:   Ruptured ovarian cyst   RLQ abdominal pain     Time reflects when diagnosis was documented in both MDM as applicable and the Disposition within this note     Time User Action Codes Description Comment    10/19/2018  2:15 AM Fredda Homme Add [S69 178] Ruptured ovarian cyst     10/19/2018  2:15 AM Fredda Homme Add [R10 31] RLQ abdominal pain       ED Disposition     ED Disposition Condition Comment    Discharge  Clarene Mya discharge to home/self care  Condition at discharge: Good        Follow-up Information     Follow up With Specialties Details Why Contact Info Additional Information    Vero Johnson MD Family Medicine Schedule an appointment as soon as possible for a visit in 2 days  [address] Emergency Department Emergency Medicine  If symptoms worsen [facility]  [phone] [address] [place] [address], [address] [employer], [place], [address]       follow up with your OB GYN- call today for an appointment             Discharge Medication List as of 10/19/2018  2:17 AM      START taking these medications    Details   ibuprofen (MOTRIN) 400 mg tablet Take 1 5 tablets (600 mg total) by mouth every 8 (eight) hours as needed for mild pain or moderate pain for up to 10 days, Starting Fri 10/19/2018, Until Mon 10/29/2018, Print           No discharge procedures on file      ED Provider  Electronically Signed by           Mook Crowley  10/19/18 9319

## 2019-05-20 ENCOUNTER — HOSPITAL ENCOUNTER (EMERGENCY)
Facility: HOSPITAL | Age: 34
Discharge: HOME | End: 2019-05-20
Attending: EMERGENCY MEDICINE | Admitting: EMERGENCY MEDICINE
Payer: COMMERCIAL

## 2019-05-20 VITALS
BODY MASS INDEX: 28.82 KG/M2 | WEIGHT: 178.57 LBS | RESPIRATION RATE: 18 BRPM | TEMPERATURE: 100.3 F | HEART RATE: 112 BPM | SYSTOLIC BLOOD PRESSURE: 149 MMHG | DIASTOLIC BLOOD PRESSURE: 72 MMHG | OXYGEN SATURATION: 98 %

## 2019-05-20 DIAGNOSIS — J02.9 PHARYNGITIS: Primary | ICD-10-CM

## 2019-05-20 PROCEDURE — 99283 EMERGENCY DEPT VISIT LOW MDM: CPT | Performed by: PHYSICIAN ASSISTANT

## 2019-05-20 PROCEDURE — 99283 EMERGENCY DEPT VISIT LOW MDM: CPT

## 2019-05-20 RX ORDER — AMOXICILLIN 500 MG/1
500 TABLET, FILM COATED ORAL 2 TIMES DAILY
Qty: 20 TABLET | Refills: 0 | Status: SHIPPED | OUTPATIENT
Start: 2019-05-20 | End: 2019-05-30

## 2019-05-20 RX ORDER — IBUPROFEN 600 MG/1
600 TABLET ORAL ONCE
Status: COMPLETED | OUTPATIENT
Start: 2019-05-20 | End: 2019-05-20

## 2019-05-20 RX ADMIN — IBUPROFEN 600 MG: 600 TABLET ORAL at 09:06
